# Patient Record
Sex: FEMALE | Race: WHITE | NOT HISPANIC OR LATINO | Employment: UNEMPLOYED | URBAN - METROPOLITAN AREA
[De-identification: names, ages, dates, MRNs, and addresses within clinical notes are randomized per-mention and may not be internally consistent; named-entity substitution may affect disease eponyms.]

---

## 2024-07-15 ENCOUNTER — OFFICE VISIT (OUTPATIENT)
Dept: URGENT CARE | Facility: CLINIC | Age: 49
End: 2024-07-15
Payer: MEDICAID

## 2024-07-15 VITALS
RESPIRATION RATE: 19 BRPM | SYSTOLIC BLOOD PRESSURE: 126 MMHG | HEIGHT: 67 IN | DIASTOLIC BLOOD PRESSURE: 66 MMHG | WEIGHT: 170 LBS | BODY MASS INDEX: 26.68 KG/M2 | HEART RATE: 98 BPM | OXYGEN SATURATION: 100 % | TEMPERATURE: 98 F

## 2024-07-15 DIAGNOSIS — S46.912A STRAIN OF LEFT SHOULDER, INITIAL ENCOUNTER: Primary | ICD-10-CM

## 2024-07-15 DIAGNOSIS — F17.210 CIGARETTE NICOTINE DEPENDENCE WITHOUT COMPLICATION: ICD-10-CM

## 2024-07-15 PROCEDURE — 96372 THER/PROPH/DIAG INJ SC/IM: CPT | Mod: S$GLB,,, | Performed by: EMERGENCY MEDICINE

## 2024-07-15 PROCEDURE — 99204 OFFICE O/P NEW MOD 45 MIN: CPT | Mod: 25,S$GLB,, | Performed by: NURSE PRACTITIONER

## 2024-07-15 PROCEDURE — 93000 ELECTROCARDIOGRAM COMPLETE: CPT | Mod: S$GLB,,, | Performed by: EMERGENCY MEDICINE

## 2024-07-15 RX ORDER — IBUPROFEN 800 MG/1
800 TABLET ORAL 3 TIMES DAILY
Qty: 30 TABLET | Refills: 0 | Status: SHIPPED | OUTPATIENT
Start: 2024-07-15

## 2024-07-15 RX ORDER — PREDNISONE 20 MG/1
40 TABLET ORAL DAILY
Qty: 10 TABLET | Refills: 0 | Status: SHIPPED | OUTPATIENT
Start: 2024-07-15 | End: 2024-07-20

## 2024-07-15 RX ORDER — ARIPIPRAZOLE 20 MG/1
5 TABLET ORAL DAILY
COMMUNITY

## 2024-07-15 RX ORDER — DEXAMETHASONE SODIUM PHOSPHATE 4 MG/ML
8 INJECTION, SOLUTION INTRA-ARTICULAR; INTRALESIONAL; INTRAMUSCULAR; INTRAVENOUS; SOFT TISSUE ONCE
Status: COMPLETED | OUTPATIENT
Start: 2024-07-15 | End: 2024-07-15

## 2024-07-15 RX ORDER — METHOCARBAMOL 500 MG/1
500 TABLET, FILM COATED ORAL 3 TIMES DAILY
Qty: 30 TABLET | Refills: 0 | Status: SHIPPED | OUTPATIENT
Start: 2024-07-15 | End: 2024-07-25

## 2024-07-15 RX ADMIN — DEXAMETHASONE SODIUM PHOSPHATE 8 MG: 4 INJECTION, SOLUTION INTRA-ARTICULAR; INTRALESIONAL; INTRAMUSCULAR; INTRAVENOUS; SOFT TISSUE at 09:07

## 2024-07-15 NOTE — PROGRESS NOTES
"Subjective:      Patient ID: Alana Beckwith is a 49 y.o. female.    Vitals:  height is 5' 7" (1.702 m) and weight is 77.1 kg (170 lb). Her oral temperature is 98 °F (36.7 °C). Her blood pressure is 126/66 and her pulse is 98. Her respiration is 19 and oxygen saturation is 100%.     Chief Complaint: Shoulder Pain  49-year-old female with no pertinent previous medical history presents complaining of left shoulder and left arm pain.  The symptoms onset several days ago and progressively worsened.  The pain is worse with movement of the left arm.  She denies any chest pain, diaphoresis, nausea, vomiting, shortness for breath.  No pleuritic type pain.  She is an active smoker.  Shoulder Pain   This is a new problem. The current episode started in the past 7 days (3 days). The problem occurs intermittently. The problem has been waxing and waning. The pain is at a severity of 6/10.       Constitution: Negative.   HENT: Negative.     Neck: neck negative.   Cardiovascular: Negative.    Eyes: Negative.    Respiratory: Negative.     Gastrointestinal: Negative.    Genitourinary: Negative.    Musculoskeletal:  Positive for pain.   Skin: Negative.  Negative for erythema.   Neurological: Negative.    Hematologic/Lymphatic: Negative.    Psychiatric/Behavioral: Negative.        Objective:     Physical Exam   Constitutional: She is oriented to person, place, and time.  Non-toxic appearance. She does not appear ill. No distress. normal  HENT:   Head: Normocephalic.   Ears:   Right Ear: Tympanic membrane normal.   Left Ear: Tympanic membrane normal.   Nose: Nose normal. No rhinorrhea or congestion.   Mouth/Throat: Mucous membranes are moist. No oropharyngeal exudate or posterior oropharyngeal erythema. Oropharynx is clear.   Eyes: Pupils are equal, round, and reactive to light.   Neck: Neck supple.   Cardiovascular: Normal rate, regular rhythm, normal heart sounds and normal pulses.   No murmur heard.Exam reveals no gallop. "   Pulmonary/Chest: Effort normal and breath sounds normal. No respiratory distress. She has no wheezes. She has no rales.   Abdominal: Normal appearance. She exhibits no distension. Soft. There is no abdominal tenderness. There is no guarding.   Musculoskeletal: Normal range of motion.         General: Normal range of motion.        Arms:    Neurological: no focal deficit. She is alert and oriented to person, place, and time.   Skin: Skin is dry, not pale and no rash. Capillary refill takes less than 2 seconds. No erythema   Psychiatric: Her behavior is normal. Mood, judgment and thought content normal.   Nursing note and vitals reviewed.      Assessment:     1. Strain of left shoulder, initial encounter        Plan:   Vital signs stable.  Physical exam demonstrates marked swelling, tenderness, and muscle spasms in the left supra scapular region.  Pain is worse with any movement of the left arm.  EKG was obtained out of the abundance of caution demonstrates sinus rhythm without ST or T-wave elevation.  No recent fever or chills.  No known sick contacts or travel.  No known trauma or injury.  Patient was stable for discharge in outpatient management.  Will start patient on short course of prednisone, NSAIDs, and Robaxin.  Patient was advised to follow up with PCP and/or orthopedics as needed.  ER for worsening symptoms or failure to improve.  Dangers of cigarette smoking were reviewed with patient in detail for 10 minutes and patient was encouraged to quit. Nicotine replacement options were discussed. Nicotine replacement options were discussed. Nicotine replacement was not prescribed per patient request.     Strain of left shoulder, initial encounter  -     dexAMETHasone injection 8 mg